# Patient Record
Sex: MALE | Race: OTHER | HISPANIC OR LATINO | URBAN - METROPOLITAN AREA
[De-identification: names, ages, dates, MRNs, and addresses within clinical notes are randomized per-mention and may not be internally consistent; named-entity substitution may affect disease eponyms.]

---

## 2018-07-12 ENCOUNTER — EMERGENCY (EMERGENCY)
Facility: HOSPITAL | Age: 28
LOS: 1 days | Discharge: HOME | End: 2018-07-12
Attending: EMERGENCY MEDICINE
Payer: COMMERCIAL

## 2018-07-12 VITALS
RESPIRATION RATE: 15 BRPM | TEMPERATURE: 98 F | HEIGHT: 63 IN | HEART RATE: 79 BPM | WEIGHT: 190.04 LBS | DIASTOLIC BLOOD PRESSURE: 98 MMHG | SYSTOLIC BLOOD PRESSURE: 141 MMHG | OXYGEN SATURATION: 98 %

## 2018-07-12 VITALS
HEART RATE: 80 BPM | TEMPERATURE: 98 F | SYSTOLIC BLOOD PRESSURE: 136 MMHG | OXYGEN SATURATION: 100 % | DIASTOLIC BLOOD PRESSURE: 87 MMHG | RESPIRATION RATE: 16 BRPM

## 2018-07-12 PROCEDURE — 99284 EMERGENCY DEPT VISIT MOD MDM: CPT

## 2018-07-12 PROCEDURE — 73630 X-RAY EXAM OF FOOT: CPT | Mod: 26,RT

## 2018-07-12 PROCEDURE — 73610 X-RAY EXAM OF ANKLE: CPT

## 2018-07-12 PROCEDURE — 73610 X-RAY EXAM OF ANKLE: CPT | Mod: 26,RT

## 2018-07-12 PROCEDURE — 73630 X-RAY EXAM OF FOOT: CPT

## 2018-07-12 RX ORDER — IBUPROFEN 200 MG
600 TABLET ORAL ONCE
Qty: 0 | Refills: 0 | Status: COMPLETED | OUTPATIENT
Start: 2018-07-12 | End: 2018-07-12

## 2018-07-12 RX ADMIN — Medication 600 MILLIGRAM(S): at 11:34

## 2018-07-12 NOTE — ED PROVIDER NOTE - PHYSICAL EXAMINATION
MS R LE:+ RIGHT ANKLE WITH LATERAL SWELLING, NO DEFORMITY NOTED, DIFFUSELY TTP. UNABLE TO ROM ANKLE DUE TO PAIN. R KNEE AND FOOT NONTENDER. SENSATION GROSSLY INTACT  PV:+ 2 DP. CAP REFILL < 2 SECONDS.

## 2018-07-12 NOTE — ED PROVIDER NOTE - ATTENDING CONTRIBUTION TO CARE
Right ankle pain post twisting injury. Exam revealed male in NAD with slight tenderness to palpation right ankle with swelling. I agree with plan and management outlined by PA.

## 2018-07-12 NOTE — ED PROVIDER NOTE - PROGRESS NOTE DETAILS
XRAY REVIEWED. RESULTS REVIEWED WITH PT. PT GIVEN A COPY. WILL RX AIR CAST, ACE WRAP APPLIED AND PT GIVEN CRUTCHES WITH INSTRUCTIONS ON USE. PT ADVISED ON NEED FOR FOLLOW UP WITH ORTHO AS WE CANNOT R/O MUSCULAR / LIGAMENT INJURY. PT ADVISED ON RICE. All questions answered and concerns addressed. pt verbalized understanding and agreement with plan and dx. pt advised to follow up with PMD. pt advised to return to ed for worsenng symptoms including fever, cp, sob. will dc.

## 2018-07-12 NOTE — ED PROVIDER NOTE - OBJECTIVE STATEMENT
pt is a 27yo male with pmhx of asthma c/o ankle pain x today. pt reports he was at work and he was stepping on a platform, twisted his right ankle and fell on his leg, NO LOC OR HEAD INJURY. pt reports right ankle swelling with increased pain with movement. pt did not take anything for pain.

## 2018-07-12 NOTE — ED ADULT NURSE NOTE - OBJECTIVE STATEMENT
Present to ER with c/o of right ankle pain. Pt states he twisted his right ankle while at work. Denies LOC.

## 2024-03-20 NOTE — ED ADULT NURSE NOTE - NSSISCREENINGQ2_ED_A_ED
Health Maintenance   Topic Date Due    Breast cancer screen  12/20/2023    Hepatitis C screen  09/07/2024 (Originally 7/12/1975)    HIV screen  09/07/2024 (Originally 7/12/1972)    Pneumococcal 0-64 years Vaccine (2 of 2 - PCV) 09/19/2024 (Originally 6/4/2020)    Shingles vaccine (1 of 2) 11/07/2024 (Originally 7/12/2009)    COVID-19 Vaccine (3 - 2023-24 season) 11/07/2024 (Originally 9/1/2023)    Respiratory Syncytial Virus (RSV) Pregnant or age 60 yrs+ (1 - 1-dose 60+ series) 12/13/2024 (Originally 7/12/2019)    DTaP/Tdap/Td vaccine (1 - Tdap) 03/14/2025 (Originally 7/12/1976)    Depression Monitoring  03/14/2025    Diabetes screen  03/29/2025    Colorectal Cancer Screen  09/25/2028    Lipids  02/22/2029    Annual Wellness Visit (Medicare Advantage)  Completed    Flu vaccine  Completed    Hepatitis A vaccine  Aged Out    Hepatitis B vaccine  Aged Out    Hib vaccine  Aged Out    Polio vaccine  Aged Out    Meningococcal (ACWY) vaccine  Aged Out    Low dose CT lung screening &/or counseling  Discontinued    Cervical cancer screen  Discontinued             (applicable per patient's age: Cancer Screenings, Depression Screening, Fall Risk Screening, Immunizations)    Hemoglobin A1C (%)   Date Value   03/29/2022 5.6   12/10/2020 5.3   05/30/2019 5.3     LDL Cholesterol (mg/dL)   Date Value   02/22/2024 100     AST (U/L)   Date Value   02/22/2024 19     ALT (U/L)   Date Value   02/22/2024 24     BUN (mg/dL)   Date Value   02/22/2024 14      (goal A1C is < 7)   (goal LDL is <100) need 30-50% reduction from baseline     BP Readings from Last 3 Encounters:   03/18/24 130/82   03/14/24 130/82   02/22/24 132/80    (goal /80)      All Future Testing planned in [facility]:  Lab Frequency Next Occurrence   CHITO DENITA DIGITAL SCREEN BILATERAL Once 01/03/2024   TSH Once 09/10/2024   T4, Free Once 09/10/2024       Next Visit Date:  Future Appointments   Date Time Provider Department Center   4/8/2024 10:00 AM [facility] MAMMOGRAPHY ROOM 
No

## 2024-07-23 ENCOUNTER — EMERGENCY (EMERGENCY)
Facility: HOSPITAL | Age: 34
LOS: 1 days | Discharge: HOME | End: 2024-07-23
Attending: INTERNAL MEDICINE | Admitting: INTERNAL MEDICINE
Payer: COMMERCIAL

## 2024-07-23 VITALS
HEIGHT: 63 IN | TEMPERATURE: 98 F | OXYGEN SATURATION: 98 % | RESPIRATION RATE: 18 BRPM | DIASTOLIC BLOOD PRESSURE: 107 MMHG | WEIGHT: 184.97 LBS | HEART RATE: 90 BPM | SYSTOLIC BLOOD PRESSURE: 171 MMHG

## 2024-07-23 VITALS
SYSTOLIC BLOOD PRESSURE: 147 MMHG | HEART RATE: 81 BPM | TEMPERATURE: 98 F | RESPIRATION RATE: 18 BRPM | DIASTOLIC BLOOD PRESSURE: 92 MMHG | OXYGEN SATURATION: 96 %

## 2024-07-23 PROBLEM — J45.909 UNSPECIFIED ASTHMA, UNCOMPLICATED: Chronic | Status: ACTIVE | Noted: 2018-07-12

## 2024-07-23 PROCEDURE — 93005 ELECTROCARDIOGRAM TRACING: CPT

## 2024-07-23 PROCEDURE — 99284 EMERGENCY DEPT VISIT MOD MDM: CPT

## 2024-07-23 PROCEDURE — 93010 ELECTROCARDIOGRAM REPORT: CPT

## 2024-07-23 PROCEDURE — 99283 EMERGENCY DEPT VISIT LOW MDM: CPT

## 2024-07-23 NOTE — ED PROVIDER NOTE - PATIENT PORTAL LINK FT
You can access the FollowMyHealth Patient Portal offered by [facility] by registering at the following website: [email or web address]. By joining [email or web address]’s FollowMyHealth portal, you will also be able to view your health information using other applications (apps) compatible with our system.

## 2024-07-23 NOTE — ED PROVIDER NOTE - SIGNIFICANT NEGATIVE FINDINGS
no headache, no exertional chest pain, no SOB, no diaphoresis , no radiation, no palpitations, no fever, no chills, no n/v,  no acute  neuro changes.

## 2024-07-23 NOTE — ED PROVIDER NOTE - CARE PROVIDER_API CALL
Ruth Elkins  Cardiology  [address] [place] [address]  Phone: [phone]  Fax: [phone]  Follow Up Time: 1-3 Days

## 2024-07-23 NOTE — ED ADULT TRIAGE NOTE - CHIEF COMPLAINT QUOTE
Pt c/o sharp L sided CP started a couple of hours ago, non-radiating. Pt states has had CP in the past more cramping but this time lasting longer than usual. EKG done.

## 2024-07-23 NOTE — ED PROVIDER NOTE - OBJECTIVE STATEMENT
33 y/o male Pt c/o sharp L sided CP started a couple of hours ago, no headache, no exertional chest pain, no SOB, no diaphoresis , no radiation, no palpitations, no fever, no chills, no n/v,  no acute  neuro changes.
